# Patient Record
Sex: FEMALE | Race: WHITE | NOT HISPANIC OR LATINO | Employment: FULL TIME | URBAN - NONMETROPOLITAN AREA
[De-identification: names, ages, dates, MRNs, and addresses within clinical notes are randomized per-mention and may not be internally consistent; named-entity substitution may affect disease eponyms.]

---

## 2024-05-14 ENCOUNTER — OFFICE VISIT (OUTPATIENT)
Dept: PRIMARY CARE | Facility: CLINIC | Age: 39
End: 2024-05-14
Payer: COMMERCIAL

## 2024-05-14 VITALS
BODY MASS INDEX: 22.98 KG/M2 | WEIGHT: 143 LBS | HEART RATE: 44 BPM | OXYGEN SATURATION: 99 % | HEIGHT: 66 IN | SYSTOLIC BLOOD PRESSURE: 100 MMHG | DIASTOLIC BLOOD PRESSURE: 70 MMHG

## 2024-05-14 DIAGNOSIS — M54.50 CHRONIC MIDLINE LOW BACK PAIN WITHOUT SCIATICA: ICD-10-CM

## 2024-05-14 DIAGNOSIS — R07.89 OTHER CHEST PAIN: Primary | ICD-10-CM

## 2024-05-14 DIAGNOSIS — G89.29 CHRONIC MIDLINE LOW BACK PAIN WITHOUT SCIATICA: ICD-10-CM

## 2024-05-14 DIAGNOSIS — R53.83 FATIGUE, UNSPECIFIED TYPE: ICD-10-CM

## 2024-05-14 DIAGNOSIS — Z13.220 SCREENING CHOLESTEROL LEVEL: ICD-10-CM

## 2024-05-14 PROCEDURE — 99204 OFFICE O/P NEW MOD 45 MIN: CPT | Performed by: FAMILY MEDICINE

## 2024-05-14 PROCEDURE — 93000 ELECTROCARDIOGRAM COMPLETE: CPT | Performed by: FAMILY MEDICINE

## 2024-05-14 PROCEDURE — 1036F TOBACCO NON-USER: CPT | Performed by: FAMILY MEDICINE

## 2024-05-14 RX ORDER — LEVONORGESTREL 52 MG/1
INTRAUTERINE DEVICE INTRAUTERINE
COMMUNITY

## 2024-05-14 RX ORDER — KETOCONAZOLE 20 MG/ML
SHAMPOO, SUSPENSION TOPICAL
COMMUNITY
Start: 2024-04-15

## 2024-05-14 ASSESSMENT — ANXIETY QUESTIONNAIRES
6. BECOMING EASILY ANNOYED OR IRRITABLE: NEARLY EVERY DAY
3. WORRYING TOO MUCH ABOUT DIFFERENT THINGS: NEARLY EVERY DAY
5. BEING SO RESTLESS THAT IT IS HARD TO SIT STILL: NEARLY EVERY DAY
4. TROUBLE RELAXING: NEARLY EVERY DAY
IF YOU CHECKED OFF ANY PROBLEMS ON THIS QUESTIONNAIRE, HOW DIFFICULT HAVE THESE PROBLEMS MADE IT FOR YOU TO DO YOUR WORK, TAKE CARE OF THINGS AT HOME, OR GET ALONG WITH OTHER PEOPLE: VERY DIFFICULT
7. FEELING AFRAID AS IF SOMETHING AWFUL MIGHT HAPPEN: NEARLY EVERY DAY
2. NOT BEING ABLE TO STOP OR CONTROL WORRYING: NEARLY EVERY DAY
1. FEELING NERVOUS, ANXIOUS, OR ON EDGE: NEARLY EVERY DAY
GAD7 TOTAL SCORE: 21

## 2024-05-14 ASSESSMENT — PATIENT HEALTH QUESTIONNAIRE - PHQ9
SUM OF ALL RESPONSES TO PHQ QUESTIONS 1-9: 14
10. IF YOU CHECKED OFF ANY PROBLEMS, HOW DIFFICULT HAVE THESE PROBLEMS MADE IT FOR YOU TO DO YOUR WORK, TAKE CARE OF THINGS AT HOME, OR GET ALONG WITH OTHER PEOPLE: VERY DIFFICULT
2. FEELING DOWN, DEPRESSED OR HOPELESS: NEARLY EVERY DAY
7. TROUBLE CONCENTRATING ON THINGS, SUCH AS READING THE NEWSPAPER OR WATCHING TELEVISION: SEVERAL DAYS
6. FEELING BAD ABOUT YOURSELF - OR THAT YOU ARE A FAILURE OR HAVE LET YOURSELF OR YOUR FAMILY DOWN: NOT AT ALL
9. THOUGHTS THAT YOU WOULD BE BETTER OFF DEAD, OR OF HURTING YOURSELF: NOT AT ALL
3. TROUBLE FALLING OR STAYING ASLEEP OR SLEEPING TOO MUCH: NEARLY EVERY DAY
8. MOVING OR SPEAKING SO SLOWLY THAT OTHER PEOPLE COULD HAVE NOTICED. OR THE OPPOSITE, BEING SO FIGETY OR RESTLESS THAT YOU HAVE BEEN MOVING AROUND A LOT MORE THAN USUAL: NOT AT ALL
1. LITTLE INTEREST OR PLEASURE IN DOING THINGS: NEARLY EVERY DAY
5. POOR APPETITE OR OVEREATING: SEVERAL DAYS
4. FEELING TIRED OR HAVING LITTLE ENERGY: NEARLY EVERY DAY
SUM OF ALL RESPONSES TO PHQ9 QUESTIONS 1 AND 2: 6

## 2024-05-14 NOTE — PROGRESS NOTES
"Subjective   Patient ID: April N Hollie is a 38 y.o. female who presents for New Patient Visit (C/o fatigue and trouble sleeping ).    HPI     Feels fluttery with anxiety   [employer] cross fit 2 to 3 per week 5 years   Tired for months x 5   Periods heavy has the mirena   No vitamins     Anxiety and deprssion   Lexapro nightmare  paxil worked but stopped during prengnancy  zoloft hated     Jacinto 7 -= 21   Stress from a lot with work and home   19 year old   X 3 months   Nothing has really changed   Has not insight into why she feels this way     Counselor 2017     Single mother   3 children   Work full time [employer]  Bad mom  because she is not doing what she is suppose to be doing       Daily marijuana smoker but trying to cut back since it is not helping anxiety   Cough   No blood   Mucus clear     [place]  ch[place]     Injury in cross fit 2 years ago when indy hit her lumbar spine   Chiropracter  in the past     Review of Systems  100/70     Objective   /70   Pulse (!) 44   Ht 1.676 m (5' 6\")   Wt 64.9 kg (143 lb)   SpO2 99%   BMI 23.08 kg/m²     Physical Exam  Vitals reviewed.   Constitutional:       General: She is not in acute distress.     Appearance: Normal appearance.   HENT:      Head: Normocephalic and atraumatic.   Eyes:      Conjunctiva/sclera: Conjunctivae normal.   Cardiovascular:      Rate and Rhythm: Normal rate and regular rhythm.      Heart sounds: No murmur heard.  Pulmonary:      Effort: Pulmonary effort is normal.      Breath sounds: Normal breath sounds.   Abdominal:      General: Abdomen is flat. Bowel sounds are normal.      Palpations: Abdomen is soft. There is no mass.   Musculoskeletal:      Comments: Non tender to palpation of zaid Lumbar paraspinals   Dtr 2 += in knee  Neg slr test    Lymphadenopathy:      Cervical: No cervical adenopathy.   Skin:     General: Skin is warm and dry.   Neurological:      General: No focal deficit present.      Mental Status: She is alert " and oriented to person, place, and time.   Psychiatric:         Mood and Affect: Mood normal.         Behavior: Behavior normal.         Thought Content: Thought content normal.         Judgment: Judgment normal.         Assessment/Plan   Diagnoses and all orders for this visit:  Other chest pain  -     ECG 12 lead (Clinic Performed)  -     XR chest 2 views; Future  Chronic midline low back pain without sciatica  -     XR lumbar spine 2-3 views; Future  Fatigue, unspecified type  -     Comprehensive Metabolic Panel; Future  -     CBC and Auto Differential; Future  -     Vitamin D 25-Hydroxy,Total (for eval of Vitamin D levels); Future  -     TSH with reflex to Free T4 if abnormal; Future  Screening cholesterol level  -     Lipid Panel; Future       Paxil start if all labs normal 10 mg dose

## 2024-05-18 ENCOUNTER — LAB (OUTPATIENT)
Dept: LAB | Facility: LAB | Age: 39
End: 2024-05-18
Payer: COMMERCIAL

## 2024-05-18 ENCOUNTER — HOSPITAL ENCOUNTER (OUTPATIENT)
Dept: RADIOLOGY | Facility: HOSPITAL | Age: 39
Discharge: HOME | End: 2024-05-18
Payer: COMMERCIAL

## 2024-05-18 DIAGNOSIS — Z13.220 SCREENING CHOLESTEROL LEVEL: ICD-10-CM

## 2024-05-18 DIAGNOSIS — R07.89 OTHER CHEST PAIN: ICD-10-CM

## 2024-05-18 DIAGNOSIS — G89.29 CHRONIC MIDLINE LOW BACK PAIN WITHOUT SCIATICA: ICD-10-CM

## 2024-05-18 DIAGNOSIS — R53.83 FATIGUE, UNSPECIFIED TYPE: ICD-10-CM

## 2024-05-18 DIAGNOSIS — M54.50 CHRONIC MIDLINE LOW BACK PAIN WITHOUT SCIATICA: ICD-10-CM

## 2024-05-18 LAB
25(OH)D3 SERPL-MCNC: 20 NG/ML (ref 30–100)
ALBUMIN SERPL BCP-MCNC: 4.4 G/DL (ref 3.4–5)
ALP SERPL-CCNC: 34 U/L (ref 33–110)
ALT SERPL W P-5'-P-CCNC: 12 U/L (ref 7–45)
ANION GAP SERPL CALC-SCNC: 9 MMOL/L (ref 10–20)
AST SERPL W P-5'-P-CCNC: 13 U/L (ref 9–39)
BASOPHILS # BLD AUTO: 0.04 X10*3/UL (ref 0–0.1)
BASOPHILS NFR BLD AUTO: 0.5 %
BILIRUB SERPL-MCNC: 0.8 MG/DL (ref 0–1.2)
BUN SERPL-MCNC: 13 MG/DL (ref 6–23)
CALCIUM SERPL-MCNC: 9.4 MG/DL (ref 8.6–10.3)
CHLORIDE SERPL-SCNC: 108 MMOL/L (ref 98–107)
CHOLEST SERPL-MCNC: 138 MG/DL (ref 0–199)
CHOLESTEROL/HDL RATIO: 1.7
CO2 SERPL-SCNC: 27 MMOL/L (ref 21–32)
CREAT SERPL-MCNC: 0.89 MG/DL (ref 0.5–1.05)
EGFRCR SERPLBLD CKD-EPI 2021: 86 ML/MIN/1.73M*2
EOSINOPHIL # BLD AUTO: 0.11 X10*3/UL (ref 0–0.7)
EOSINOPHIL NFR BLD AUTO: 1.3 %
ERYTHROCYTE [DISTWIDTH] IN BLOOD BY AUTOMATED COUNT: 12.8 % (ref 11.5–14.5)
GLUCOSE SERPL-MCNC: 102 MG/DL (ref 74–99)
HCT VFR BLD AUTO: 40.9 % (ref 36–46)
HDLC SERPL-MCNC: 79 MG/DL
HGB BLD-MCNC: 13.4 G/DL (ref 12–16)
IMM GRANULOCYTES # BLD AUTO: 0.03 X10*3/UL (ref 0–0.7)
IMM GRANULOCYTES NFR BLD AUTO: 0.4 % (ref 0–0.9)
LDLC SERPL CALC-MCNC: 50 MG/DL
LYMPHOCYTES # BLD AUTO: 2.99 X10*3/UL (ref 1.2–4.8)
LYMPHOCYTES NFR BLD AUTO: 36.6 %
MCH RBC QN AUTO: 29.7 PG (ref 26–34)
MCHC RBC AUTO-ENTMCNC: 32.8 G/DL (ref 32–36)
MCV RBC AUTO: 91 FL (ref 80–100)
MONOCYTES # BLD AUTO: 0.62 X10*3/UL (ref 0.1–1)
MONOCYTES NFR BLD AUTO: 7.6 %
NEUTROPHILS # BLD AUTO: 4.39 X10*3/UL (ref 1.2–7.7)
NEUTROPHILS NFR BLD AUTO: 53.6 %
NON HDL CHOLESTEROL: 59 MG/DL (ref 0–149)
NRBC BLD-RTO: 0 /100 WBCS (ref 0–0)
PLATELET # BLD AUTO: 267 X10*3/UL (ref 150–450)
POTASSIUM SERPL-SCNC: 4.3 MMOL/L (ref 3.5–5.3)
PROT SERPL-MCNC: 6.7 G/DL (ref 6.4–8.2)
RBC # BLD AUTO: 4.51 X10*6/UL (ref 4–5.2)
SODIUM SERPL-SCNC: 140 MMOL/L (ref 136–145)
TRIGL SERPL-MCNC: 44 MG/DL (ref 0–149)
TSH SERPL-ACNC: 2.13 MIU/L (ref 0.44–3.98)
VLDL: 9 MG/DL (ref 0–40)
WBC # BLD AUTO: 8.2 X10*3/UL (ref 4.4–11.3)

## 2024-05-18 PROCEDURE — 71046 X-RAY EXAM CHEST 2 VIEWS: CPT | Performed by: RADIOLOGY

## 2024-05-18 PROCEDURE — 84443 ASSAY THYROID STIM HORMONE: CPT

## 2024-05-18 PROCEDURE — 82306 VITAMIN D 25 HYDROXY: CPT

## 2024-05-18 PROCEDURE — 80053 COMPREHEN METABOLIC PANEL: CPT

## 2024-05-18 PROCEDURE — 72100 X-RAY EXAM L-S SPINE 2/3 VWS: CPT

## 2024-05-18 PROCEDURE — 36415 COLL VENOUS BLD VENIPUNCTURE: CPT

## 2024-05-18 PROCEDURE — 85025 COMPLETE CBC W/AUTO DIFF WBC: CPT

## 2024-05-18 PROCEDURE — 80061 LIPID PANEL: CPT

## 2024-05-18 PROCEDURE — 72100 X-RAY EXAM L-S SPINE 2/3 VWS: CPT | Performed by: RADIOLOGY

## 2024-05-18 PROCEDURE — 71046 X-RAY EXAM CHEST 2 VIEWS: CPT

## 2024-07-02 DIAGNOSIS — F41.1 GAD (GENERALIZED ANXIETY DISORDER): Primary | ICD-10-CM

## 2024-07-02 RX ORDER — PAROXETINE 10 MG/1
10 TABLET, FILM COATED ORAL EVERY MORNING
Qty: 30 TABLET | Refills: 1 | Status: SHIPPED | OUTPATIENT
Start: 2024-07-02 | End: 2024-08-31

## 2024-07-15 ENCOUNTER — APPOINTMENT (OUTPATIENT)
Dept: PRIMARY CARE | Facility: CLINIC | Age: 39
End: 2024-07-15
Payer: COMMERCIAL

## 2024-07-15 VITALS
SYSTOLIC BLOOD PRESSURE: 104 MMHG | OXYGEN SATURATION: 90 % | WEIGHT: 145.1 LBS | BODY MASS INDEX: 23.32 KG/M2 | HEART RATE: 69 BPM | HEIGHT: 66 IN | DIASTOLIC BLOOD PRESSURE: 74 MMHG

## 2024-07-15 DIAGNOSIS — F41.1 GAD (GENERALIZED ANXIETY DISORDER): ICD-10-CM

## 2024-07-15 DIAGNOSIS — E55.9 VITAMIN D DEFICIENCY: Primary | ICD-10-CM

## 2024-07-15 PROCEDURE — 99213 OFFICE O/P EST LOW 20 MIN: CPT | Performed by: FAMILY MEDICINE

## 2024-07-15 PROCEDURE — 1036F TOBACCO NON-USER: CPT | Performed by: FAMILY MEDICINE

## 2024-07-15 RX ORDER — PAROXETINE 10 MG/1
10 TABLET, FILM COATED ORAL EVERY MORNING
Qty: 30 TABLET | Refills: 5 | Status: SHIPPED | OUTPATIENT
Start: 2024-07-15 | End: 2025-07-15

## 2024-07-15 ASSESSMENT — ANXIETY QUESTIONNAIRES
2. NOT BEING ABLE TO STOP OR CONTROL WORRYING: SEVERAL DAYS
6. BECOMING EASILY ANNOYED OR IRRITABLE: SEVERAL DAYS
7. FEELING AFRAID AS IF SOMETHING AWFUL MIGHT HAPPEN: MORE THAN HALF THE DAYS
4. TROUBLE RELAXING: NOT AT ALL
1. FEELING NERVOUS, ANXIOUS, OR ON EDGE: SEVERAL DAYS
3. WORRYING TOO MUCH ABOUT DIFFERENT THINGS: SEVERAL DAYS
IF YOU CHECKED OFF ANY PROBLEMS ON THIS QUESTIONNAIRE, HOW DIFFICULT HAVE THESE PROBLEMS MADE IT FOR YOU TO DO YOUR WORK, TAKE CARE OF THINGS AT HOME, OR GET ALONG WITH OTHER PEOPLE: NOT DIFFICULT AT ALL
GAD7 TOTAL SCORE: 7
5. BEING SO RESTLESS THAT IT IS HARD TO SIT STILL: SEVERAL DAYS

## 2024-07-15 ASSESSMENT — PATIENT HEALTH QUESTIONNAIRE - PHQ9
1. LITTLE INTEREST OR PLEASURE IN DOING THINGS: NOT AT ALL
2. FEELING DOWN, DEPRESSED OR HOPELESS: NOT AT ALL
SUM OF ALL RESPONSES TO PHQ9 QUESTIONS 1 AND 2: 0

## 2024-07-15 NOTE — PROGRESS NOTES
"Subjective   Patient ID: April N Hollie is a 38 y.o. female who presents for Follow-up (2 month follow up).    HPI     Anxiety and deprssion   Lexapro nightmare  paxil worked but stopped during prengnancy  zoloft hated   Restarted Paxil   Active with crossfit      Eliezer 7 = 21 May 2024    ELIEZER 7  = 7   Counselor 2017      Single mother   3 children   Work full time [employer]              Vitamin D deficiency     [employer]  daily     Paxil x 10 days     Was overwhelmed and felt sad and angry      Can focus better      Loss of appetite with paxil      Energy level is better    75% better   Daily planner  for appt and bills   Went to dentist            Less stomach irritation   No N/v      Sleeping average 6 hours   No snoring     Review of Systems    Objective   /74 (BP Location: Left arm, Patient Position: Sitting)   Pulse 69   Ht 1.676 m (5' 6\")   Wt 65.8 kg (145 lb 1.6 oz)   SpO2 90%   BMI 23.42 kg/m²     Physical Exam  Vitals reviewed.   Constitutional:       Appearance: Normal appearance.   HENT:      Head: Normocephalic and atraumatic.   Eyes:      Conjunctiva/sclera: Conjunctivae normal.   Cardiovascular:      Rate and Rhythm: Normal rate and regular rhythm.   Pulmonary:      Effort: Pulmonary effort is normal.      Breath sounds: Normal breath sounds.   Musculoskeletal:      Cervical back: Neck supple.   Skin:     General: Skin is warm and dry.   Neurological:      General: No focal deficit present.      Mental Status: She is alert and oriented to person, place, and time.   Psychiatric:         Mood and Affect: Mood normal.         Behavior: Behavior normal.         Thought Content: Thought content normal.         Judgment: Judgment normal.         Assessment/Plan   Diagnoses and all orders for this visit:  Vitamin D deficiency  -     Vitamin D 25-Hydroxy,Total (for eval of Vitamin D levels); Future  ELIEZER (generalized anxiety disorder)  -     PARoxetine (Paxil) 10 mg tablet; " Take 1 tablet (10 mg) by mouth once daily in the morning.

## 2025-01-17 ENCOUNTER — APPOINTMENT (OUTPATIENT)
Dept: PRIMARY CARE | Facility: CLINIC | Age: 40
End: 2025-01-17
Payer: COMMERCIAL

## 2025-04-18 ENCOUNTER — APPOINTMENT (OUTPATIENT)
Dept: PRIMARY CARE | Facility: CLINIC | Age: 40
End: 2025-04-18
Payer: COMMERCIAL

## 2025-04-18 VITALS
HEIGHT: 66 IN | WEIGHT: 143.8 LBS | BODY MASS INDEX: 23.11 KG/M2 | SYSTOLIC BLOOD PRESSURE: 121 MMHG | DIASTOLIC BLOOD PRESSURE: 75 MMHG | HEART RATE: 55 BPM

## 2025-04-18 DIAGNOSIS — K59.00 CONSTIPATION, UNSPECIFIED CONSTIPATION TYPE: ICD-10-CM

## 2025-04-18 DIAGNOSIS — R10.13 EPIGASTRIC PAIN: Primary | ICD-10-CM

## 2025-04-18 PROCEDURE — 99213 OFFICE O/P EST LOW 20 MIN: CPT

## 2025-04-18 PROCEDURE — 3008F BODY MASS INDEX DOCD: CPT

## 2025-04-18 PROCEDURE — 1036F TOBACCO NON-USER: CPT

## 2025-04-18 ASSESSMENT — ENCOUNTER SYMPTOMS
ABDOMINAL PAIN: 1
NAUSEA: 1
PALPITATIONS: 0
PAIN: 1
SHORTNESS OF BREATH: 0
HEADACHES: 0
FATIGUE: 0
CONSTIPATION: 1
DIARRHEA: 0
VOMITING: 0
COUGH: 0
FEVER: 0
LIGHT-HEADEDNESS: 0
CHILLS: 0

## 2025-04-18 NOTE — PROGRESS NOTES
"Subjective   Patient ID: Doris Browne is a 39 y.o. female who presents for Pain, Abdominal Pain, GERD, and Irritable Bowel Syndrome.    Pain  Associated symptoms include abdominal pain, chest pain, constipation and nausea. Pertinent negatives include no diarrhea, fatigue, fever, headaches, shortness of breath or vomiting.   Abdominal Pain  Associated symptoms include constipation and nausea. Pertinent negatives include no diarrhea, fever, headaches or vomiting. Her past medical history is significant for GERD.   GERD  She complains of abdominal pain, chest pain and nausea. She reports no coughing. Pertinent negatives include no fatigue.      Here today for acute concerns with abdominal pain in the epigastric region. Reports it is extremely random, not after eating, cannot attribute it to any specific food, is not worse with certain foods either.   Does suffer from IBS-C and reports the symptoms are bloating and constipation. She does report using creatine but otherwise follows a healthy diet.    Review of Systems   Constitutional:  Negative for chills, fatigue and fever.   Respiratory:  Negative for cough and shortness of breath.    Cardiovascular:  Positive for chest pain. Negative for palpitations and leg swelling.   Gastrointestinal:  Positive for abdominal pain, constipation and nausea. Negative for diarrhea and vomiting.   Neurological:  Negative for light-headedness and headaches.       Objective   /75 (Patient Position: Sitting)   Pulse 55   Ht 1.676 m (5' 6\")   Wt 65.2 kg (143 lb 12.8 oz)   BMI 23.21 kg/m²     Physical Exam  Cardiovascular:      Rate and Rhythm: Normal rate and regular rhythm.   Skin:     Capillary Refill: Capillary refill takes less than 2 seconds.   Neurological:      Mental Status: She is alert and oriented to person, place, and time.         Assessment/Plan   Problem List Items Addressed This Visit    None       GERD  -Start omeprazole 20 mg daily for 2 weeks   -If no " improvement with order EGD     IBS-C  -Increase water intake   -Metamucil or benefiber daily   -If no improvement with bloating and constipation can consider colonoscopy

## 2025-05-27 ENCOUNTER — OFFICE VISIT (OUTPATIENT)
Dept: PRIMARY CARE | Facility: CLINIC | Age: 40
End: 2025-05-27
Payer: COMMERCIAL

## 2025-05-27 VITALS
SYSTOLIC BLOOD PRESSURE: 114 MMHG | BODY MASS INDEX: 22.68 KG/M2 | DIASTOLIC BLOOD PRESSURE: 72 MMHG | HEIGHT: 66 IN | HEART RATE: 52 BPM | WEIGHT: 141.1 LBS

## 2025-05-27 DIAGNOSIS — R59.1 LYMPHADENOPATHY: Primary | ICD-10-CM

## 2025-05-27 DIAGNOSIS — R53.83 OTHER FATIGUE: ICD-10-CM

## 2025-05-27 PROCEDURE — 99214 OFFICE O/P EST MOD 30 MIN: CPT | Performed by: NURSE PRACTITIONER

## 2025-05-27 PROCEDURE — 1036F TOBACCO NON-USER: CPT | Performed by: NURSE PRACTITIONER

## 2025-05-27 PROCEDURE — 3008F BODY MASS INDEX DOCD: CPT | Performed by: NURSE PRACTITIONER

## 2025-05-27 ASSESSMENT — ENCOUNTER SYMPTOMS
WHEEZING: 0
CONSTIPATION: 0
PALPITATIONS: 0
ABDOMINAL PAIN: 0
DIARRHEA: 0
NAUSEA: 0
CRAMPS: 1
CHILLS: 0
SHORTNESS OF BREATH: 0
VOMITING: 0
ABDOMINAL DISTENTION: 0
FATIGUE: 1
COUGH: 0
FEVER: 0

## 2025-05-27 NOTE — PROGRESS NOTES
Subjective   Patient ID: Doris Browne is a 39 y.o. female who presents for Mass (Right pelvis, neck, head) and Abdominal Cramping.  40 yo presents today with masses all over.  States she went to dentist for grafting and bone graft for broken tooth, States she was put on Clindamycin and developed a rash for 2 days; around May 12th.  Stopped the ATB and then developed lumps on head, neck, groin.  She also has developed a cramping both sides of abd. On and off.  States she has not felt well since and feels fatigued.  She has not seen ob/gyn in a while to follow fibroid or her abnormal menses.    Abdominal Cramping  Pertinent negatives include no constipation, diarrhea, fever, nausea or vomiting.       Review of Systems   Constitutional:  Positive for fatigue. Negative for chills and fever.   Respiratory:  Negative for cough, shortness of breath and wheezing.    Cardiovascular:  Negative for chest pain and palpitations.   Gastrointestinal:  Negative for abdominal distention, abdominal pain, constipation, diarrhea, nausea and vomiting.       Objective   Physical Exam  Vitals and nursing note reviewed.   Constitutional:       General: She is not in acute distress.     Appearance: She is not ill-appearing.   HENT:      Head: Normocephalic.   Neck:      Comments: Cervical lymph nodes enlargement noted bilaterally.  Cardiovascular:      Rate and Rhythm: Normal rate and regular rhythm.      Pulses: Normal pulses.      Heart sounds: Normal heart sounds. No murmur heard.     No friction rub. No gallop.   Pulmonary:      Effort: Pulmonary effort is normal.      Breath sounds: Normal breath sounds. No wheezing, rhonchi or rales.   Abdominal:      General: Bowel sounds are normal.      Tenderness: There is no abdominal tenderness. There is no guarding.      Comments: Enlarged lymph node noted in right groin.   Musculoskeletal:      Right lower leg: No edema.      Left lower leg: No edema.   Lymphadenopathy:      Cervical:  "Cervical adenopathy present.   Skin:     General: Skin is warm and dry.   Neurological:      Mental Status: She is alert.   Psychiatric:         Mood and Affect: Mood normal.       /72 (Patient Position: Sitting)   Pulse 52   Ht 1.676 m (5' 6\")   Wt 64 kg (141 lb 1.6 oz)   BMI 22.77 kg/m²     Current Medications[1]   Medical History[2]   Surgical History[3]     Family History[4]     Assessment/Plan   Problem List Items Addressed This Visit    None  Visit Diagnoses         Lymphadenopathy    -  Primary    Relevant Orders    CBC and Auto Differential    Juan-Barr Virus Antibody Panel (VCA IgG/IgM, EA IgG, NA IgG)    Iron and TIBC    Vitamin B12    Ferritin      Other fatigue        Relevant Orders    CBC and Auto Differential    Juan-Barr Virus Antibody Panel (VCA IgG/IgM, EA IgG, NA IgG)    Iron and TIBC    Vitamin B12    Ferritin        Follow up in 2 wks.           [1]   Current Outpatient Medications:     ketoconazole (NIZOral) 2 % shampoo, WASH THE AFFECTED AREAS OF THE BODY DAILY, LETTING SIT 3-5 MINUTES BEFORE RINSING, Disp: , Rfl:     levonorgestrel (Mirena) 21 mcg/24 hours (8 yrs) 52 mg IUD, by intrauterine route., Disp: , Rfl:     PARoxetine (Paxil) 10 mg tablet, Take 1 tablet (10 mg) by mouth once daily in the morning., Disp: 30 tablet, Rfl: 5  [2]   Past Medical History:  Diagnosis Date    Encounter for gynecological examination (general) (routine) without abnormal findings 11/15/2019    Pap test, as part of routine gynecological examination    Encounter for screening for malignant neoplasm of cervix 2020    Encounter for Papanicolaou smear of cervix    Other conditions influencing health status     History of vaginal delivery    Other conditions influencing health status     Menarche    Personal history of other complications of pregnancy, childbirth and the puerperium     History of spontaneous    [3]   Past Surgical History:  Procedure Laterality Date    BONE GRAFT      " OTHER SURGICAL HISTORY  11/05/2019    [place] tooth extraction    OTHER SURGICAL HISTORY  11/05/2019    Adenoidectomy    OTHER SURGICAL HISTORY  11/05/2019    Tubal ligation    OTHER SURGICAL HISTORY  05/18/2020    Ear pressure equalization tube insertion    WISDOM TOOTH EXTRACTION     [4]   Family History  Problem Relation Name Age of Onset    Heart attack Father      Heart disease Maternal Grandmother

## 2025-05-28 LAB
BASOPHILS # BLD AUTO: 49 CELLS/UL (ref 0–200)
BASOPHILS NFR BLD AUTO: 0.5 %
EBV NA IGG SER IA-ACNC: 162 U/ML
EBV VCA IGG SER IA-ACNC: 53.2 U/ML
EBV VCA IGM SER IA-ACNC: <36 U/ML
EOSINOPHIL # BLD AUTO: 88 CELLS/UL (ref 15–500)
EOSINOPHIL NFR BLD AUTO: 0.9 %
ERYTHROCYTE [DISTWIDTH] IN BLOOD BY AUTOMATED COUNT: 12.3 % (ref 11–15)
FERRITIN SERPL-MCNC: 131 NG/ML (ref 16–154)
HCT VFR BLD AUTO: 42.2 % (ref 35–45)
HGB BLD-MCNC: 13.6 G/DL (ref 11.7–15.5)
IRON SATN MFR SERPL: 26 % (CALC) (ref 16–45)
IRON SERPL-MCNC: 79 MCG/DL (ref 40–190)
LYMPHOCYTES # BLD AUTO: 3234 CELLS/UL (ref 850–3900)
LYMPHOCYTES NFR BLD AUTO: 33 %
MCH RBC QN AUTO: 29.8 PG (ref 27–33)
MCHC RBC AUTO-ENTMCNC: 32.2 G/DL (ref 32–36)
MCV RBC AUTO: 92.3 FL (ref 80–100)
MONOCYTES # BLD AUTO: 745 CELLS/UL (ref 200–950)
MONOCYTES NFR BLD AUTO: 7.6 %
NEUTROPHILS # BLD AUTO: 5684 CELLS/UL (ref 1500–7800)
NEUTROPHILS NFR BLD AUTO: 58 %
PLATELET # BLD AUTO: 285 THOUSAND/UL (ref 140–400)
PMV BLD REES-ECKER: 11.9 FL (ref 7.5–12.5)
QUEST EBV PANEL INTERPRETATION:: ABNORMAL
RBC # BLD AUTO: 4.57 MILLION/UL (ref 3.8–5.1)
TIBC SERPL-MCNC: 309 MCG/DL (CALC) (ref 250–450)
VIT B12 SERPL-MCNC: 422 PG/ML (ref 200–1100)
WBC # BLD AUTO: 9.8 THOUSAND/UL (ref 3.8–10.8)

## 2025-05-28 RX ORDER — DOXYCYCLINE 100 MG/1
100 CAPSULE ORAL 2 TIMES DAILY
Qty: 14 CAPSULE | Refills: 0 | Status: SHIPPED | OUTPATIENT
Start: 2025-05-28 | End: 2025-06-04

## 2025-06-10 ENCOUNTER — APPOINTMENT (OUTPATIENT)
Dept: PRIMARY CARE | Facility: CLINIC | Age: 40
End: 2025-06-10
Payer: COMMERCIAL

## 2025-06-10 VITALS
WEIGHT: 139.4 LBS | HEIGHT: 66 IN | SYSTOLIC BLOOD PRESSURE: 113 MMHG | OXYGEN SATURATION: 98 % | DIASTOLIC BLOOD PRESSURE: 73 MMHG | BODY MASS INDEX: 22.4 KG/M2 | HEART RATE: 53 BPM

## 2025-06-10 DIAGNOSIS — R59.0 LYMPHADENOPATHY, INGUINAL: Primary | ICD-10-CM

## 2025-06-10 PROCEDURE — 3008F BODY MASS INDEX DOCD: CPT | Performed by: NURSE PRACTITIONER

## 2025-06-10 PROCEDURE — 99213 OFFICE O/P EST LOW 20 MIN: CPT | Performed by: NURSE PRACTITIONER

## 2025-06-10 PROCEDURE — 1036F TOBACCO NON-USER: CPT | Performed by: NURSE PRACTITIONER

## 2025-06-10 ASSESSMENT — ENCOUNTER SYMPTOMS
RESPIRATORY NEGATIVE: 1
GASTROINTESTINAL NEGATIVE: 1
FEVER: 1
CARDIOVASCULAR NEGATIVE: 1

## 2025-06-10 NOTE — PROGRESS NOTES
"Subjective   Patient ID: April N Hollie is a 39 y.o. female who presents for Follow-up (2 weeks, fatigue is improving, lump on the lower right side is painful).  40 yo female presents today for follow up of lab.  States she is feeling better, not as fatigued.  Reports she continues to have a lymph node in groin that is painful to touch and painful on particular movements.  She works out and it hurts to perform squats.  States she noticed another lymph node in her left groin that is smaller.  States she finished all of her ATB, Doxycycline but had some nausea with it.         Review of Systems   Constitutional:  Positive for fever.   Respiratory: Negative.     Cardiovascular: Negative.    Gastrointestinal: Negative.        Objective   Physical Exam  Vitals and nursing note reviewed.   Constitutional:       General: She is not in acute distress.  Neck:      Comments: Noted left and right cervical lymph nodes, small size.  No tenderness on palpation.   Cardiovascular:      Rate and Rhythm: Normal rate and regular rhythm.      Pulses: Normal pulses.      Heart sounds: Normal heart sounds. No murmur heard.     No friction rub. No gallop.   Pulmonary:      Effort: Pulmonary effort is normal.      Breath sounds: Normal breath sounds. No wheezing, rhonchi or rales.   Abdominal:      Tenderness: There is abdominal tenderness.      Comments: Noted right lymph node enlargement in groin, tenderness on palpation.  Noted lymph node in left groin, small and no tenderness on palpation.    Musculoskeletal:      Right lower leg: No edema.      Left lower leg: No edema.   Lymphadenopathy:      Cervical: Cervical adenopathy present.   Skin:     General: Skin is warm and dry.   Neurological:      Mental Status: She is alert.   Psychiatric:         Mood and Affect: Mood normal.       /73   Pulse 53   Ht 1.676 m (5' 6\")   Wt 63.2 kg (139 lb 6.4 oz)   SpO2 98%   BMI 22.50 kg/m²     Current Medications[1]   Medical History[2] "   Surgical History[3]     Family History[4]     Assessment/Plan   Problem List Items Addressed This Visit    None  Visit Diagnoses         Lymphadenopathy, inguinal    -  Primary    Relevant Orders    CT pelvis w and wo IV contrast        She is going to check with her insurance to see if they will pay for CT pelvis.  She will also be on vacation the next wk.  In the meantime, she will monitor for any growth or changes in her lymph nodes.           [1]   Current Outpatient Medications:     ketoconazole (NIZOral) 2 % shampoo, WASH THE AFFECTED AREAS OF THE BODY DAILY, LETTING SIT 3-5 MINUTES BEFORE RINSING, Disp: , Rfl:     levonorgestrel (Mirena) 21 mcg/24 hours (8 yrs) 52 mg IUD, by intrauterine route., Disp: , Rfl:     PARoxetine (Paxil) 10 mg tablet, Take 1 tablet (10 mg) by mouth once daily in the morning., Disp: 30 tablet, Rfl: 5  [2]   Past Medical History:  Diagnosis Date    Encounter for gynecological examination (general) (routine) without abnormal findings 11/15/2019    Pap test, as part of routine gynecological examination    Encounter for screening for malignant neoplasm of cervix 2020    Encounter for Papanicolaou smear of cervix    Other conditions influencing health status     History of vaginal delivery    Other conditions influencing health status     Menarche    Personal history of other complications of pregnancy, childbirth and the puerperium     History of spontaneous    [3]   Past Surgical History:  Procedure Laterality Date    BONE GRAFT      OTHER SURGICAL HISTORY  2019    [place] tooth extraction    OTHER SURGICAL HISTORY  2019    Adenoidectomy    OTHER SURGICAL HISTORY  2019    Tubal ligation    OTHER SURGICAL HISTORY  2020    Ear pressure equalization tube insertion    WISDOM TOOTH EXTRACTION     [4]   Family History  Problem Relation Name Age of Onset    Heart attack Father      Heart disease Maternal Grandmother

## 2025-06-24 DIAGNOSIS — B37.0 ORAL CANDIDA: Primary | ICD-10-CM

## 2025-06-24 RX ORDER — NYSTATIN 100000 [USP'U]/ML
500000 SUSPENSION ORAL 4 TIMES DAILY
Qty: 280 ML | Refills: 0 | Status: SHIPPED | OUTPATIENT
Start: 2025-06-24 | End: 2025-07-08

## 2025-07-01 ENCOUNTER — HOSPITAL ENCOUNTER (OUTPATIENT)
Dept: RADIOLOGY | Facility: HOSPITAL | Age: 40
Discharge: HOME | End: 2025-07-01
Payer: COMMERCIAL

## 2025-07-01 DIAGNOSIS — R59.0 LYMPHADENOPATHY, INGUINAL: ICD-10-CM

## 2025-07-01 PROCEDURE — 2550000001 HC RX 255 CONTRASTS: Performed by: NURSE PRACTITIONER

## 2025-07-01 PROCEDURE — 72193 CT PELVIS W/DYE: CPT

## 2025-07-01 PROCEDURE — 72193 CT PELVIS W/DYE: CPT | Performed by: RADIOLOGY

## 2025-07-01 RX ADMIN — IOHEXOL 72 ML: 350 INJECTION, SOLUTION INTRAVENOUS at 16:01

## 2025-07-16 DIAGNOSIS — K38.9 APPENDICOLITH: Primary | ICD-10-CM

## 2025-07-22 ENCOUNTER — APPOINTMENT (OUTPATIENT)
Dept: SURGERY | Facility: CLINIC | Age: 40
End: 2025-07-22
Payer: COMMERCIAL

## 2025-07-22 VITALS
WEIGHT: 146 LBS | DIASTOLIC BLOOD PRESSURE: 74 MMHG | HEIGHT: 66 IN | BODY MASS INDEX: 23.46 KG/M2 | SYSTOLIC BLOOD PRESSURE: 110 MMHG | HEART RATE: 45 BPM

## 2025-07-22 DIAGNOSIS — K38.9 APPENDICOLITH: Primary | ICD-10-CM

## 2025-07-22 PROCEDURE — 99204 OFFICE O/P NEW MOD 45 MIN: CPT | Performed by: SURGERY

## 2025-07-22 PROCEDURE — 3008F BODY MASS INDEX DOCD: CPT | Performed by: SURGERY

## 2025-07-22 RX ORDER — CREATINE 100 %
1 POWDER (GRAM) MISCELLANEOUS DAILY
COMMUNITY

## 2025-07-22 NOTE — PROGRESS NOTES
General Surgery Consultation    Patient: Doris Browne  : 1985  MRN: [id number]  Date of Consultation: 25    Primary Care Provider: Saleem Samson MD  Referring Provider: Bridgette Bob PA-C    Chief Complaint: Appendicolith    History of Present Illness: Doris Browne is a 39 y.o. old female seen at the request of Bridgette Bob PA-C for evaluation of appendicoliths found on CT scan done to evaluate possible lymphadenopathy of her right groin.  She has been having some discomfort in her groin area.  She was told she needed to discuss her alternatives related to the appendicoliths and elective appendectomy.  She does CrossFit and is concerned about missing time from that.  We discussed possibly doing a less rigorous workout.    Medical History:  Medical History[1]    Surgical History:  Surgical History[2]    Home Medications:  Prior to Admission medications   Medication Sig Start Date End Date Taking? Authorizing Provider   ketoconazole (NIZOral) 2 % shampoo WASH THE AFFECTED AREAS OF THE BODY DAILY, LETTING SIT 3-5 MINUTES BEFORE RINSING 4/15/24  Yes Historical Provider, MD   levonorgestrel (Mirena) 21 mcg/24 hours (8 yrs) 52 mg IUD by intrauterine route.   Yes Historical Provider, MD   creatine, bulk, 100 % powder Take 1 Scoop by mouth once daily.    Historical Provider, MD   PARoxetine (Paxil) 10 mg tablet Take 1 tablet (10 mg) by mouth once daily in the morning. 7/15/24 7/15/25  Saleem Samson MD       Allergies:  Allergies[3]  is allergic to doxycycline, penicillins, and clindamycin.    Family History:   Family History[4]    Social History:  Social History[5]    ROS:  Constitutional:  no fever, sweats, and chills  Cardiovascular: No chest pain  Respiratory: No cough or shortness of breath  Gastrointestinal: Denies  Genitourinary: no dysuria  Musculoskeletal: no weakness or swelling  Integumentary: no rashes  Neurological: no confusion  Endocrine: no heat or cold intolerance  Heme/Lymph: no  "easy bruising or bleeding    Objective:  /74   Pulse (!) 45   Ht 1.676 m (5' 6\")   Wt 66.2 kg (146 lb)   BMI 23.57 kg/m²     Physical Exam:  Constitutional: No acute distress, conversant, pleasant  Neurologic: alert and oriented  Psych: appropriate affect  Ears, Nose, Mouth and Throat: mucus membranes moist  Pulmonary: No labored breathing  Cardiovascular: Regular rate and rhythm  Abdomen: soft, non-distended, non-tender I do not feel any inguinal adenopathy.  She does shave her groins.  Musculoskeletal: Moves all extremities, no edema  Skin: warm and dry    Imaging:  I have independently reviewed her CT images.    Assessment and Plan: April N Hollie is a 39 y.o. old female with appendicoliths found when looking for adenopathy.  I discussed with the shaving this could cause some adenopathy on an intermittent basis.  However on the CT scan no pathologic nodes noted.  It did show the appendicoliths.  We discussed the alternatives of proceeding with elective appendectomy versus just wait full watching.  We discussed the importance of if there was a concern about appendicitis seeking immediate follow-up due to the increased risk of early perforation and abscess etc.  We discussed with the appendicolith there is some thought there is a slightly increased risk of developing appendicitis but it is treated the same way.  We went to great deal of depth discussing the procedure risks and potential complications as well as postoperative activity restrictions.  She has good questions.  She ultimately opted to not proceed with surgery at this time.  She understands if she changes her mind she may call and we can get the surgery scheduled.  I again stressed the importance of seeking evaluation if there is concern about appendicitis which would probably entail an ER visit for CT scan.  All questions were answered.  Prescribed her some Carafate to take as a slurry which she can take on a as needed basis.  Jimena FINNEY" MD Radha  2025         [1]   Past Medical History:  Diagnosis Date    Anxiety and depression     Encounter for gynecological examination (general) (routine) without abnormal findings 11/15/2019    Pap test, as part of routine gynecological examination    Encounter for screening for malignant neoplasm of cervix 2020    Encounter for Papanicolaou smear of cervix    IBS (irritable bowel syndrome)     Other conditions influencing health status     History of vaginal delivery    Other conditions influencing health status     Menarche    Personal history of other complications of pregnancy, childbirth and the puerperium     History of spontaneous    [2]   Past Surgical History:  Procedure Laterality Date    BONE GRAFT      OTHER SURGICAL HISTORY  2019    [place] tooth extraction    OTHER SURGICAL HISTORY  2019    Adenoidectomy    OTHER SURGICAL HISTORY  2019    Tubal ligation    OTHER SURGICAL HISTORY  2020    Ear pressure equalization tube insertion    WISDOM TOOTH EXTRACTION     [3]   Allergies  Allergen Reactions    Doxycycline Other     thrush    Penicillins Other    Clindamycin Rash   [4]   Family History  Problem Relation Name Age of Onset    Uterine cancer Mother      Heart attack Father      Diabetes Mother's Brother      Heart disease Maternal Grandmother      Stroke Maternal Grandmother     [5]   Social History  Socioeconomic History    Marital status:    Tobacco Use    Smoking status: Never    Smokeless tobacco: Never   Vaping Use    Vaping status: Never Used   Substance and Sexual Activity    Alcohol use: Not Currently     Comment: rarely drinks a selzer    Drug use: Yes     Types: Marijuana    Sexual activity: Defer

## 2025-07-23 ENCOUNTER — APPOINTMENT (OUTPATIENT)
Dept: PRIMARY CARE | Facility: CLINIC | Age: 40
End: 2025-07-23
Payer: COMMERCIAL

## 2025-07-23 VITALS
BODY MASS INDEX: 23.27 KG/M2 | DIASTOLIC BLOOD PRESSURE: 70 MMHG | SYSTOLIC BLOOD PRESSURE: 110 MMHG | OXYGEN SATURATION: 98 % | WEIGHT: 144.2 LBS | HEART RATE: 54 BPM

## 2025-07-23 DIAGNOSIS — K21.9 GASTROESOPHAGEAL REFLUX DISEASE WITHOUT ESOPHAGITIS: ICD-10-CM

## 2025-07-23 DIAGNOSIS — M26.621 ARTHRALGIA OF RIGHT TEMPOROMANDIBULAR JOINT: Primary | ICD-10-CM

## 2025-07-23 DIAGNOSIS — K13.3 HAIRY LEUKOPLAKIA OF TONGUE: ICD-10-CM

## 2025-07-23 PROCEDURE — 99214 OFFICE O/P EST MOD 30 MIN: CPT | Performed by: FAMILY MEDICINE

## 2025-07-23 NOTE — PROGRESS NOTES
Subjective   Patient ID: Doris Browne is a 39 y.o. female who presents for Thrush (Not improving;  has had a constant dry red sore throat for months, having some right sided jaw/ear pain.  Still has swollen lymph nodes in her neck and groin, fatigue, just feels off.  Had side effects to doxycycline (thrush).  Has a concern about it being cancer).  HPI  No data recorded     2.5 months   ST   Tried omeprazole x 14 day did nothing for ST       Patient does not smoke nicotine cigarettes but THC  And has been using a Listerine mouthwash that is irritating to her throat    Did not start as sore but irritated   Had pyrosis and better GERD   Dr Garcia  started carafate ? Yesterday       After look red no white spots  No fevers but felt bad   EBV past infection   Symptoms tired swollen LN groin and neck   Doxycycline gave her white tongue and still white thick hard to talk   Nystatin tid for week     Patient concerned about red throat and esophageal cancer.  She has no red flags of unintentional weight loss anemia or dysphagia.    Hurts to chew right side   Has indent on the right     Review of Systems    Objective   Visit Vitals  /70   Pulse 54   Wt 65.4 kg (144 lb 3.2 oz)   SpO2 98%   BMI 23.27 kg/m²   OB Status Having periods   Smoking Status Never   BSA 1.74 m²       Physical Exam  Vitals reviewed.   Constitutional:       Appearance: Normal appearance.   HENT:      Head: Normocephalic and atraumatic.      Right Ear: Tympanic membrane, ear canal and external ear normal.      Left Ear: Tympanic membrane, ear canal and external ear normal.      Ears:      Comments: Has about 6 and crystals in the right ear     Mouth/Throat:      Comments: Oropharynx is normal I do not see any tonsil lifts there is no thrush she has a slight white coated tongue consistent with hairy tongue.    Eyes:      Conjunctiva/sclera: Conjunctivae normal.       Cardiovascular:      Rate and Rhythm: Normal rate and regular rhythm.    Pulmonary:      Effort: Pulmonary effort is normal.      Breath sounds: Normal breath sounds.     Musculoskeletal:      Cervical back: Neck supple.      Comments: Right TMJ full range of motion but has some clicking and popping with minimal pain     Skin:     General: Skin is warm and dry.     Neurological:      General: No focal deficit present.      Mental Status: She is alert and oriented to person, place, and time.     Psychiatric:         Mood and Affect: Mood normal.         Behavior: Behavior normal.         Thought Content: Thought content normal.         Judgment: Judgment normal.              Assessment/Plan   Diagnoses and all orders for this visit:  Arthralgia of right temporomandibular joint  Hairy leukoplakia of tongue  Gastroesophageal reflux disease without esophagitis      Recommend swim ear   Soft foods   Brush tongue     Otc prilosec x 2 months          Saleem Samson MD 07/23/25 11:40 AM

## 2025-07-27 PROBLEM — K38.9 APPENDICOLITH: Status: ACTIVE | Noted: 2025-07-27

## 2025-08-15 ENCOUNTER — OFFICE VISIT (OUTPATIENT)
Dept: PRIMARY CARE | Facility: CLINIC | Age: 40
End: 2025-08-15
Payer: COMMERCIAL

## 2025-08-15 VITALS
BODY MASS INDEX: 23.74 KG/M2 | WEIGHT: 147.1 LBS | DIASTOLIC BLOOD PRESSURE: 70 MMHG | SYSTOLIC BLOOD PRESSURE: 100 MMHG | HEART RATE: 48 BPM | OXYGEN SATURATION: 97 %

## 2025-08-15 DIAGNOSIS — S46.812A STRAIN OF LEFT SUPRASPINATUS MUSCLE, INITIAL ENCOUNTER: Primary | ICD-10-CM

## 2025-08-15 PROCEDURE — 99213 OFFICE O/P EST LOW 20 MIN: CPT | Performed by: FAMILY MEDICINE

## 2025-08-15 RX ORDER — CYCLOBENZAPRINE HCL 10 MG
10 TABLET ORAL NIGHTLY PRN
Qty: 30 TABLET | Refills: 0 | Status: SHIPPED | OUTPATIENT
Start: 2025-08-15 | End: 2025-10-14

## 2025-08-15 RX ORDER — MELOXICAM 15 MG/1
15 TABLET ORAL DAILY
Qty: 30 TABLET | Refills: 0 | Status: SHIPPED | OUTPATIENT
Start: 2025-08-15 | End: 2026-08-15

## 2025-09-09 ENCOUNTER — APPOINTMENT (OUTPATIENT)
Dept: PRIMARY CARE | Facility: CLINIC | Age: 40
End: 2025-09-09
Payer: COMMERCIAL